# Patient Record
Sex: FEMALE | Race: WHITE | NOT HISPANIC OR LATINO | Employment: FULL TIME | URBAN - NONMETROPOLITAN AREA
[De-identification: names, ages, dates, MRNs, and addresses within clinical notes are randomized per-mention and may not be internally consistent; named-entity substitution may affect disease eponyms.]

---

## 2020-02-03 ENCOUNTER — HOSPITAL ENCOUNTER (EMERGENCY)
Facility: HOSPITAL | Age: 35
Discharge: HOME | End: 2020-02-03
Attending: FAMILY MEDICINE | Admitting: FAMILY MEDICINE

## 2020-02-03 VITALS
DIASTOLIC BLOOD PRESSURE: 99 MMHG | WEIGHT: 269 LBS | SYSTOLIC BLOOD PRESSURE: 131 MMHG | HEIGHT: 67 IN | HEART RATE: 71 BPM | TEMPERATURE: 98 F | BODY MASS INDEX: 42.22 KG/M2 | OXYGEN SATURATION: 99 % | RESPIRATION RATE: 18 BRPM

## 2020-02-03 DIAGNOSIS — S71.159A: Primary | ICD-10-CM

## 2020-02-03 PROCEDURE — 99283 EMERGENCY DEPT VISIT LOW MDM: CPT

## 2020-02-03 PROCEDURE — 25010000002 TDAP 5-2.5-18.5 LF-MCG/0.5 SUSPENSION: Performed by: NURSE PRACTITIONER

## 2020-02-03 PROCEDURE — 90715 TDAP VACCINE 7 YRS/> IM: CPT | Performed by: NURSE PRACTITIONER

## 2020-02-03 PROCEDURE — 90471 IMMUNIZATION ADMIN: CPT | Performed by: NURSE PRACTITIONER

## 2020-02-03 RX ORDER — IBUPROFEN 600 MG/1
600 TABLET ORAL ONCE
Status: COMPLETED | OUTPATIENT
Start: 2020-02-03 | End: 2020-02-03

## 2020-02-03 RX ORDER — DOXYCYCLINE HYCLATE 100 MG/1
100 TABLET, DELAYED RELEASE ORAL 2 TIMES DAILY
Qty: 14 TABLET | Refills: 0 | Status: SHIPPED | OUTPATIENT
Start: 2020-02-03 | End: 2020-02-10

## 2020-02-03 RX ORDER — DIAPER,BRIEF,INFANT-TODD,DISP
EACH MISCELLANEOUS ONCE
Status: COMPLETED | OUTPATIENT
Start: 2020-02-03 | End: 2020-02-03

## 2020-02-03 RX ADMIN — IBUPROFEN 600 MG: 600 TABLET ORAL at 13:18

## 2020-02-03 RX ADMIN — BACITRACIN 1 APPLICATION: 500 OINTMENT TOPICAL at 13:09

## 2020-02-03 RX ADMIN — TETANUS TOXOID, REDUCED DIPHTHERIA TOXOID AND ACELLULAR PERTUSSIS VACCINE, ADSORBED 0.5 ML: 5; 2.5; 8; 8; 2.5 SUSPENSION INTRAMUSCULAR at 13:07

## 2020-02-03 NOTE — DISCHARGE INSTRUCTIONS
Follow up with health department and dog owner to verify rabies status of dog. If cannot verify in the next 24-48 hours, please obtain rabies vaccine at that time. Keep the area clean. Complete your antibiotics as prescribed. May use Ibuprofen or Tylenol as needed for pain.

## 2020-02-05 NOTE — ED PROVIDER NOTES
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 As they were walking in the yard one dog jumped on her and pushed her into another dog who bit her on the right upper outer thigh through her work jeans. She states the leigha states the dog is updated on all vaccines including rabies but could not produce proof at that time. She states she has reported this to her boss who is talking to the health department at this time. She states she would like to go ahead with rabies vaccines at this time.           Review of Systems   Constitutional: Negative.    Skin: Positive for wound.       History reviewed. No pertinent past medical history.    Allergies   Allergen Reactions   • Prednisone Anaphylaxis   • Amoxicillin Rash   • Biaxin [Clarithromycin] Rash   • Ceclor [Cefaclor] Rash   • Codimal-A [Brompheniramine] Rash   • Other Palpitations     Kapeidex (trial drug)       History reviewed. No pertinent surgical history.    History reviewed. No pertinent family history.    Social History     Socioeconomic History   • Marital status:      Spouse name: Not on file   • Number of children: Not on file   • Years of education: Not on file   • Highest education level: Not on file   Tobacco Use   • Smoking status: Never Smoker   • Smokeless tobacco: Never Used           Objective   Physical Exam   Constitutional: She is oriented to person, place, and time. No distress.   Obese female, pleasant   Cardiovascular: Normal rate, regular rhythm and normal heart sounds.   No murmur heard.  Pulmonary/Chest: Effort normal and breath sounds normal. No respiratory distress.   Musculoskeletal: Normal range of motion.   Neurological: She is alert and oriented to person, place, and time.   Skin: Skin is warm and dry. Capillary refill takes less than 2 seconds.   ~1\" abrasion and 1 puncture wound noted to right thigh, no bleeding   Psychiatric: She has a normal mood and affect. Her " behavior is normal. Judgment and thought content normal.   Nursing note and vitals reviewed.      Procedures           ED Course  ED Course as of Feb 05 0925   Mon Feb 03, 2020   1230 Rabies vaccine ordered. I was called back to patient's room. Patient states she would prefer to find out about rabies status on dog before starting the rabies vaccine series. Risk and benefits of vaccine discussed with patient. Advised to start vaccine series as soon as possible in the next 24-48 hour if status of dog's rabies vaccine cannot be verified. Patient verbalized understanding.     [SH]      ED Course User Index  [SH] Coty Portillo APRN                                               [facility]    Final diagnoses:   Animal bite of thigh, initial encounter            Coty Portillo APRN  02/05/20 [id number]

## 2024-10-05 NOTE — ED NOTES
Animal bite form faxed to [facility].      Nicole Ruiz, ALCON  02/03/20 1878    
Pt presents to the ED with c/o pain from a dog bite on the right posterior leg. Two puncture wounds noted, bleeding controlled. Pt reports she was bit while at work, unsure of what kind of dog. Pt reports the owner of the dog reports the dog is UTD on vaccines and at this time refuses the rabies vaccine.         Nicole Ruiz, ALCON  02/03/20 [id number]    
show